# Patient Record
Sex: MALE | Race: WHITE | Employment: FULL TIME | URBAN - METROPOLITAN AREA
[De-identification: names, ages, dates, MRNs, and addresses within clinical notes are randomized per-mention and may not be internally consistent; named-entity substitution may affect disease eponyms.]

---

## 2021-03-27 ENCOUNTER — APPOINTMENT (OUTPATIENT)
Dept: CT IMAGING | Age: 26
End: 2021-03-27
Attending: EMERGENCY MEDICINE
Payer: COMMERCIAL

## 2021-03-27 ENCOUNTER — HOSPITAL ENCOUNTER (EMERGENCY)
Age: 26
Discharge: LEFT BEFORE DISCHARGE | End: 2021-03-27
Attending: EMERGENCY MEDICINE | Admitting: EMERGENCY MEDICINE
Payer: COMMERCIAL

## 2021-03-27 VITALS
TEMPERATURE: 97.7 F | RESPIRATION RATE: 18 BRPM | DIASTOLIC BLOOD PRESSURE: 98 MMHG | HEART RATE: 103 BPM | SYSTOLIC BLOOD PRESSURE: 115 MMHG | BODY MASS INDEX: 35.22 KG/M2 | OXYGEN SATURATION: 99 % | HEIGHT: 70 IN | WEIGHT: 246.03 LBS

## 2021-03-27 DIAGNOSIS — F10.20 ALCOHOL USE DISORDER, MODERATE, DEPENDENCE (HCC): ICD-10-CM

## 2021-03-27 DIAGNOSIS — K76.82 HEPATIC ENCEPHALOPATHY: Primary | ICD-10-CM

## 2021-03-27 DIAGNOSIS — R42 DIZZINESS: ICD-10-CM

## 2021-03-27 DIAGNOSIS — R79.89 ELEVATED LFTS: ICD-10-CM

## 2021-03-27 LAB
ALBUMIN SERPL-MCNC: 3.7 G/DL (ref 3.5–5)
ALBUMIN/GLOB SERPL: 1 {RATIO} (ref 1.1–2.2)
ALP SERPL-CCNC: 77 U/L (ref 45–117)
ALT SERPL-CCNC: 130 U/L (ref 12–78)
AMMONIA PLAS-SCNC: 104 UMOL/L
ANION GAP SERPL CALC-SCNC: 5 MMOL/L (ref 5–15)
AST SERPL-CCNC: 81 U/L (ref 15–37)
BASOPHILS # BLD: 0.1 K/UL (ref 0–0.1)
BASOPHILS NFR BLD: 1 % (ref 0–1)
BILIRUB SERPL-MCNC: 0.5 MG/DL (ref 0.2–1)
BUN SERPL-MCNC: 12 MG/DL (ref 6–20)
BUN/CREAT SERPL: 11 (ref 12–20)
CALCIUM SERPL-MCNC: 8.5 MG/DL (ref 8.5–10.1)
CHLORIDE SERPL-SCNC: 110 MMOL/L (ref 97–108)
CO2 SERPL-SCNC: 26 MMOL/L (ref 21–32)
CREAT SERPL-MCNC: 1.05 MG/DL (ref 0.7–1.3)
DIFFERENTIAL METHOD BLD: ABNORMAL
EOSINOPHIL # BLD: 0.3 K/UL (ref 0–0.4)
EOSINOPHIL NFR BLD: 4 % (ref 0–7)
ERYTHROCYTE [DISTWIDTH] IN BLOOD BY AUTOMATED COUNT: 13.8 % (ref 11.5–14.5)
ETHANOL SERPL-MCNC: 297 MG/DL
GLOBULIN SER CALC-MCNC: 3.7 G/DL (ref 2–4)
GLUCOSE BLD STRIP.AUTO-MCNC: 109 MG/DL (ref 65–100)
GLUCOSE SERPL-MCNC: 100 MG/DL (ref 65–100)
HCT VFR BLD AUTO: 45 % (ref 36.6–50.3)
HGB BLD-MCNC: 16.1 G/DL (ref 12.1–17)
IMM GRANULOCYTES # BLD AUTO: 0 K/UL (ref 0–0.04)
IMM GRANULOCYTES NFR BLD AUTO: 0 % (ref 0–0.5)
LYMPHOCYTES # BLD: 4 K/UL (ref 0.8–3.5)
LYMPHOCYTES NFR BLD: 48 % (ref 12–49)
MAGNESIUM SERPL-MCNC: 2.2 MG/DL (ref 1.6–2.4)
MCH RBC QN AUTO: 33.1 PG (ref 26–34)
MCHC RBC AUTO-ENTMCNC: 35.8 G/DL (ref 30–36.5)
MCV RBC AUTO: 92.6 FL (ref 80–99)
MONOCYTES # BLD: 0.8 K/UL (ref 0–1)
MONOCYTES NFR BLD: 9 % (ref 5–13)
NEUTS SEG # BLD: 3.2 K/UL (ref 1.8–8)
NEUTS SEG NFR BLD: 38 % (ref 32–75)
NRBC # BLD: 0 K/UL (ref 0–0.01)
NRBC BLD-RTO: 0 PER 100 WBC
PHOSPHATE SERPL-MCNC: 3 MG/DL (ref 2.6–4.7)
PLATELET # BLD AUTO: 219 K/UL (ref 150–400)
PMV BLD AUTO: 8.9 FL (ref 8.9–12.9)
POTASSIUM SERPL-SCNC: 3.9 MMOL/L (ref 3.5–5.1)
PROT SERPL-MCNC: 7.4 G/DL (ref 6.4–8.2)
RBC # BLD AUTO: 4.86 M/UL (ref 4.1–5.7)
RBC MORPH BLD: ABNORMAL
SERVICE CMNT-IMP: ABNORMAL
SODIUM SERPL-SCNC: 141 MMOL/L (ref 136–145)
WBC # BLD AUTO: 8.4 K/UL (ref 4.1–11.1)

## 2021-03-27 PROCEDURE — 74011250637 HC RX REV CODE- 250/637: Performed by: EMERGENCY MEDICINE

## 2021-03-27 PROCEDURE — 80053 COMPREHEN METABOLIC PANEL: CPT

## 2021-03-27 PROCEDURE — 82962 GLUCOSE BLOOD TEST: CPT

## 2021-03-27 PROCEDURE — 82140 ASSAY OF AMMONIA: CPT

## 2021-03-27 PROCEDURE — 83735 ASSAY OF MAGNESIUM: CPT

## 2021-03-27 PROCEDURE — 99283 EMERGENCY DEPT VISIT LOW MDM: CPT

## 2021-03-27 PROCEDURE — 82077 ASSAY SPEC XCP UR&BREATH IA: CPT

## 2021-03-27 PROCEDURE — 70450 CT HEAD/BRAIN W/O DYE: CPT

## 2021-03-27 PROCEDURE — 84100 ASSAY OF PHOSPHORUS: CPT

## 2021-03-27 PROCEDURE — 36415 COLL VENOUS BLD VENIPUNCTURE: CPT

## 2021-03-27 PROCEDURE — 85025 COMPLETE CBC W/AUTO DIFF WBC: CPT

## 2021-03-27 RX ADMIN — LACTULOSE 30 ML: 20 SOLUTION ORAL at 22:24

## 2021-03-28 NOTE — ED PROVIDER NOTES
EMERGENCY DEPARTMENT HISTORY AND PHYSICAL EXAM      Date: 3/27/2021  Patient Name: Sami Fernandez    History of Presenting Illness     Chief Complaint   Patient presents with    Altered mental status     Patient states that he has been having trouble collecting his thoughts since yesterday. His father is with him stating that he has been very emotional and beligerant at times. He also states that he has not been making sense while trying to talk to him,. Patient is diabetic and does not regularly check his sugar. He states he knows if his sugar is high or low by the way he feels. He also has drank alcohol today but \"only a little bit\" . His blood glucose in triage is 109. Patient denies any SI or HI .  Headache       History Provided By: Patient    HPI: Sami Fernandez, 30 y.o. male  With past medical history of alcohol use daily, diabetes, and daily marijuana use presenting today with symptoms of confusion and difficulty thinking. The patient says that this started yesterday. He notes that he has not had symptoms like this in the past.  The patient denies any fevers. He notes that he does have vomiting in the morning sometimes when he feels an acid sensation in his stomach. He notes that he smokes marijuana and typically smokes blunts and bowls. He does not use concentrated THC oil or dab wax. No other drug use. He does note that he drinks alcohol daily and typically will drink a couple of beers and often x 2-3 shots during the week and then more on the weekends. He denies any prior history of liver disease. No weakness, numbness, or tingling. His dad and him are concerned about \"personality changes \"where he feels more agitated because he cannot gather his thoughts well. There are no other complaints, changes, or physical findings at this time. PCP: None    No current facility-administered medications on file prior to encounter. No current outpatient medications on file prior to encounter. Past History     Past Medical History:  No past medical history on file. Alcohol use disorder    Past Surgical History:  No past surgical history on file. Family History:  No family history on file. Social History:  Social History     Tobacco Use    Smoking status: Not on file   Substance Use Topics    Alcohol use: Not on file    Drug use: Not on file   Alcohol: 1-2 beers and 2-3 shots per day  Drug use: Marijuana Daily      Allergies:  No Known Allergies      Review of Systems   Constitutional: No  fever  Skin: No  rash  HEENT: No  nasal congestion  Resp: No cough  CV: No chest pain  GI: No vomiting  : No dysuria  MSK: No joint pain  Neuro: No numbness  Psych: No anxiety      Physical Exam     Patient Vitals for the past 12 hrs:   Temp Pulse Resp BP SpO2   03/27/21 1947 97.7 °F (36.5 °C) (!) 103 18 (!) 146/99 99 %     General: alert, No acute distress  Eyes: EOMI, normal conjunctiva  ENT: moist mucous membranes. Neck: Active, full ROM of neck. Skin: No rashes. no jaundice              Lungs: Equal chest expansion. no respiratory distress. Heart: tachycardic with a  regular rhythm     no peripheral edema     Abd:  non distended soft   Back: Full ROM  MSK: Full, active ROM in all 4 extremities.    Neuro:   II: vision grossly intact  III, IV, VI: Extraocular motion intact, pupils reactive to light  V: facial sensation intact  VII: face symmetric with normal eye closure and smile  VIII: hearing intact to finger rub bilaterally  IX, X: palate elevates symmetrically, phonation normal  XII: tongue midline with normal movements  Motor: normal bulk, tone, and strength bilaterally, no pronator drift  Sensory: normal sensation to light touch in bilateral upper and lower extremities  Coordination: intact to rapid alternating movements  Psych: Cooperative with exam; Appropriate mood and affect                 Diagnostic Study Results     Labs -     Recent Results (from the past 12 hour(s))   GLUCOSE, POC Collection Time: 03/27/21  7:50 PM   Result Value Ref Range    Glucose (POC) 109 (H) 65 - 100 mg/dL    Performed by Ulisses Meeks RN    CBC WITH AUTOMATED DIFF    Collection Time: 03/27/21  8:18 PM   Result Value Ref Range    WBC 8.4 4.1 - 11.1 K/uL    RBC 4.86 4.10 - 5.70 M/uL    HGB 16.1 12.1 - 17.0 g/dL    HCT 45.0 36.6 - 50.3 %    MCV 92.6 80.0 - 99.0 FL    MCH 33.1 26.0 - 34.0 PG    MCHC 35.8 30.0 - 36.5 g/dL    RDW 13.8 11.5 - 14.5 %    PLATELET 317 474 - 459 K/uL    MPV 8.9 8.9 - 12.9 FL    NRBC 0.0 0  WBC    ABSOLUTE NRBC 0.00 0.00 - 0.01 K/uL    NEUTROPHILS 38 32 - 75 %    LYMPHOCYTES 48 12 - 49 %    MONOCYTES 9 5 - 13 %    EOSINOPHILS 4 0 - 7 %    BASOPHILS 1 0 - 1 %    IMMATURE GRANULOCYTES 0 0.0 - 0.5 %    ABS. NEUTROPHILS 3.2 1.8 - 8.0 K/UL    ABS. LYMPHOCYTES 4.0 (H) 0.8 - 3.5 K/UL    ABS. MONOCYTES 0.8 0.0 - 1.0 K/UL    ABS. EOSINOPHILS 0.3 0.0 - 0.4 K/UL    ABS. BASOPHILS 0.1 0.0 - 0.1 K/UL    ABS. IMM. GRANS. 0.0 0.00 - 0.04 K/UL    DF MANUAL      RBC COMMENTS NORMOCYTIC, NORMOCHROMIC     METABOLIC PANEL, COMPREHENSIVE    Collection Time: 03/27/21  8:18 PM   Result Value Ref Range    Sodium 141 136 - 145 mmol/L    Potassium 3.9 3.5 - 5.1 mmol/L    Chloride 110 (H) 97 - 108 mmol/L    CO2 26 21 - 32 mmol/L    Anion gap 5 5 - 15 mmol/L    Glucose 100 65 - 100 mg/dL    BUN 12 6 - 20 MG/DL    Creatinine 1.05 0.70 - 1.30 MG/DL    BUN/Creatinine ratio 11 (L) 12 - 20      GFR est AA >60 >60 ml/min/1.73m2    GFR est non-AA >60 >60 ml/min/1.73m2    Calcium 8.5 8.5 - 10.1 MG/DL    Bilirubin, total 0.5 0.2 - 1.0 MG/DL    ALT (SGPT) 130 (H) 12 - 78 U/L    AST (SGOT) 81 (H) 15 - 37 U/L    Alk.  phosphatase 77 45 - 117 U/L    Protein, total 7.4 6.4 - 8.2 g/dL    Albumin 3.7 3.5 - 5.0 g/dL    Globulin 3.7 2.0 - 4.0 g/dL    A-G Ratio 1.0 (L) 1.1 - 2.2     ETHYL ALCOHOL    Collection Time: 03/27/21  8:18 PM   Result Value Ref Range    ALCOHOL(ETHYL),SERUM 297 (H) <10 MG/DL   MAGNESIUM    Collection Time: 03/27/21  8:18 PM   Result Value Ref Range    Magnesium 2.2 1.6 - 2.4 mg/dL   PHOSPHORUS    Collection Time: 03/27/21  8:18 PM   Result Value Ref Range    Phosphorus 3.0 2.6 - 4.7 MG/DL   AMMONIA    Collection Time: 03/27/21  8:51 PM   Result Value Ref Range    Ammonia 104 (H) <32 UMOL/L       Radiologic Studies -   CT HEAD WO CONT   Final Result      No acute intracranial abnormality on this noncontrast head CT. CT Results  (Last 48 hours)               03/27/21 2116  CT HEAD WO CONT Final result    Impression:      No acute intracranial abnormality on this noncontrast head CT. Narrative:  EXAM: CT HEAD WO CONT       INDICATION: Confusion for one day. EtOH and marijuana use. COMPARISON: None       TECHNIQUE: Noncontrast head CT. Coronal and sagittal reformats. CT dose   reduction was achieved through the use of a standardized protocol tailored for   this examination and automatic exposure control for dose modulation. FINDINGS: The ventricles and sulci are age-appropriate without hydrocephalus. There is no mass effect or midline shift. There is no intracranial hemorrhage or   extra-axial fluid collection. There is no abnormal area of decreased density to   suggest infarct. The calvarium is intact. The visualized paranasal sinuses and mastoid air cells   are clear. CXR Results  (Last 48 hours)    None          Medical Decision Making   I am the first provider for this patient. I reviewed the vital signs, available nursing notes, past medical history, past surgical history, family history and social history. Vital Signs-Reviewed the patient's vital signs.   Patient Vitals for the past 12 hrs:   Temp Pulse Resp BP SpO2   03/27/21 1947 97.7 °F (36.5 °C) (!) 103 18 (!) 146/99 99 %       Pulse Oximetry Analysis - 99% on room air  -  Interpretation: Normal    Cardiac Monitor:   Rate: 103 bpm  Rhythm: Sinus Tachycardia      Provider Notes (Medical Decision Grisel g):     Differential Diagnosis: Alcohol use disorder, marijuana use disorder, electrolyte arrangement, Wernickes encephalopathy, stroke    Initial Plan: Will obtain basic laboratory studies, CT the head, alcohol level and UDS and reassess. The patient has overall vague symptoms, likely related to his alcohol use disorder and marijuana use disorder. ED Course:   Initial assessment performed. The patients presenting problems have been discussed, and they are in agreement with the care plan formulated and outlined with them. I have encouraged them to ask questions as they arise throughout their visit. ED Course as of Mar 27 2234   Sat Mar 27, 2021   2228 10:29 PM  \"I informed the pt that he needed further observation and further workup for adequate evaluation for his Hepatic encephalopathy  and that by refusing the above, he is at risk for further deterioration, coma, and worsening liver function  He is awake, alert, and he understands his condition and the risks involved in leaving. The patient has received no pain medications. He is clinically aware of his surroundings and able to ask appropriate questions, the patients relative and the nurse present confirms he is not clinically intoxicated and able to make medical decisions. He verbalized knowing the risks and understood it was recommended that he stay and could also return at any time. The patient's relative was present for the discussion and also verbalized that they understood both diagnosis, risks and could return at any time. They were both provided with warnings regarding worsening of his condition and were provided instructions to follow up with PCP tomorrow or return to the Emergency Room as soon as possible. This discussion was witnessed by nurse Ran. Anika Best MD            [NW]   1837 On my interpretation of the patient's laboratory studies ammonia is elevated, LFTs are elevated as well, alcohol level 297.     [NW]   3818 On my interpretation of the patient CT had no evidence of acute abnormality. [NW]   2295 Patient presenting today with vague symptoms including confusion, dizziness, agitation. Found to have an elevated ammonia level. The patient is a chronic alcohol user and uses alcohol daily with withdrawal symptoms. He and his dad and myself had a long conversation and I discussed with him the dangers of continued alcohol usage. I recommended admission to the hospital for the hepatic encephalopathy and initiation of lactulose therapy. The patient does not wish to be admitted to the hospital.  Although he has had alcohol today, he is clinically sober, awake, answering all questions, and competent to make his own medical decisions. He elected to not be admitted to the hospital.  I discussed with him that I will send him home with lactulose therapy and refer him to primary care provider as well as GI for further evaluation. We discussed his alcohol use could cause worsening of his liver function and possibly hepatic failure. The patient and his dad understand the risks and he ultimately elects to leave the emergency department [address]    [NW]      ED Course User Index  [NW] Clare Ogden MD       I, Tra Coelho MD, am the attending of record for this patient encounter. Dispo: AMA       PLAN:  Current Discharge Medication List      START taking these medications    Details   lactulose (CEPHULAC) 20 gram packet Take 1 Packet by mouth two (2) times a day for 30 days. Qty: 60 Packet, Refills: 0           2. Follow-up Information     Follow up With Specialties Details Why Vincent Martinez MD Gastroenterology   [address]  [address] [facility]  [phone]          3. Return to ED if worse       Diagnosis     Clinical Impression:   1. Hepatic encephalopathy ([address]    2. Dizziness    3.  Alcohol use disorder, moderate, dependence (HCC)    4. Elevated LFTs Attestations:    Bakari Valdivia MD    Please note that this dictation was completed with Ik[employer], the computer voice recognition software. Quite often unanticipated grammatical, syntax, homophones, and other interpretive errors are inadvertently transcribed by the computer software. Please disregard these errors. Please excuse any errors that have escaped final proofreading. Thank you.

## 2021-03-28 NOTE — DISCHARGE INSTRUCTIONS
Substance Abuse and Addiction Resources    [facility]  [phone]  Closed meeting- family members that have been affected bysomeone alcohol abuse  Open meeting everyone can attend. Alcoholic's Anonymous [phone]  Non professional (alcoholics in recovery helping others)  [employer] (talk about sobriety, have desire)  No charge    [address] Venia [place] is the Treatment Consultant in the [facility]  Free one-on-one phone consultation and insurance verification  12 step based meetings and philosophy  Family programs and sessions    [facility] [phone]  Free individual assessment  Intensive outpatient outpatient program  Ambulatory withdrawal management/detoxification  Insurance required    [facility] [phone] ext [phone] or [phone] for patients with no insurance, Medicaid, Medicare  Sliding fee scale available for self pay  Patients go Monday-Friday from 8-4:30 to central intake for a shelter and then to [facility] for services  Offers a variety of services I.e. Laundry, shower, eye clinic, medical clinic, dental, substance abuse services, case management, etc.    [facility] [phone]  Make appointment with counselor  $23 fee for initial hour intake    [facility] [phone]  Offers Detox and Inpatient Treatment for men only. Social detox  Is a homeless shelter with longterm 12 step program. Can choose just access to the hshelter component  Must be able to walk <1miles one way to attend classes, be highly motivated and willing to complete all 12 steps. 8 months- 1 year is the typical length of stay if accepted    [facility] [phone]  For residents of [facility]  They offer outpatient treatment and can make referrals for inpatient careBased on income. Will [employer]. Accept Medicaid.   They have a walk in clinic that patients can go to be screened for services. Two on the [facility] and [place] side [facility]:   [place] [id number], [facility] (near [facility]). Walk in hours: Mon or Wed       12:30pm - Tammie Jason Lorena Bynum. Walk in hours: Tuesday 12:30pm 3pm Wed       8:30am- 11am    [employer] [phone]  $3500 entry fee  12 step meeting plan  No sex offenders accepted. Must get a job within 30 days after admission  After the 12 week, additional $125/week to stay    Narcotic Anonymous [phone], [phone]  Will refer to [facility] into Triage (takes 10-15 minutes)  Proof of [facility] residence with Picture ID  Medicaid and Self Pay. NO Private insurance    [place] [phone]  Referrals come from organizations like [facility] [employer] [facility], [employer], [employer] [facility]. Must be self pay. No insurance allowed. No patients on prescribed narcotics. No sex offenders. Need all medical mental health information and medication list sent prior to admit. [facility] [phone] ext [address] between 21-65  Need social security card and picture ID  Must pass breath test and 10 panel Urine Drug Screen  No Sex Offenders or Psychiatric patients  Must not have been a 3x repeat at this facility  6 month in house- has to participate in work therapy 40 hours/week  Participates in spiritual classes, bible study and Zoroastrianism    Malay Alcoholics Anonymous [address], sent by [employer]  No Sex 1140 [facility] (by [facility] [address])    [address] [place] [address]  [phone]  Monday through Friday 8:30am to 5:00pm  Brief Telephone Interview. Then will be scheduled for next substance abuse services orientation group and then scheduled for intake interview.     [facility]  [phone]  Walk in Monday through Friday 9:00AM to 3:00PM  Bring Picture ID, Proof of residence (piece of mail), Proof of Insurance (Medicaid/sliding scale), Proof of income (any)    [facility] [phone]  Walk in then Assessment by licensed professional   [place] office ([address] [facility]) Monday, Tuesday, Thursday  9AM to 789 [facility] office ([address] [place], [address]) [address]  [phone]  Walk In Monday to Friday starting at [address] ID, Proof of residence (piece of mail), Proof of insurance (Medicaid/sliding scale)  At 9AM is the Substance Abuse Services Orientation Group and then scheduled for Intake Evaluation.    Local Primary Care Physicians  [facility] [phone]  Randeen Moritz, MD Selwyn Boron, MD Lydia Estrada MD [facility] [phone]  Courtney Connor, P  Bonny Taylor, MD Stefan Gomez MD [address] [address] [phone]  Rafael Freitas, MD Montse Donald MD [address] [facility] [phone]  MD Dakota Cueva MD Marigene Shape, MD Zachery Gambles, MD   [facility] [phone]  EUGENIA ZARAGOZA, MD Khurram Garrett, NP 1001 [facility] [phone]  [facility], MD Chau Bernard, MD Shadi Sánchez, MD Andrea Chatterjee, MD Chris Wharton, MD Beryle Severance, MD   [facility]  Sylvia Jones MD [facility] [phone]  MD Chen Barksdale, NP  Lashell Morales, MD Mariaelena Gooden, MD Severiano Gear, MD Salud Pastrana, MD Sheldon Stevens MD   [address]80 [facility] [phone]  Robbi Godfrey, MD Marly Britt, FNP  Neha Randle, NP  Kaitlyn Hull, MD Ashby Severance, MD Shantel Mix, MD EL[facility]IN [facility] [phone]  MD Ulysses Patel MD Mardene Standard, MD Emeline Passy, MD Ottis Mylar, MD   [address] [phone]  MD Jude Osuna MD [facility] [phone]  Brandon Hawthorne, MD Wilber Worthington, MD Chandra Elliott MD   1903 [facility] [phone]  Olimpia Guajardo, MD Andrea Arzola, MD Eryn Lizama MD Estel Skipper, MD Olla Meter, NP  Valerie Verdin MD [address]  66   [phone]  MD Allyson Duncan, MD Rudi Byrd MD   2106 [facility] [phone]  MD Renae Gutierrez, FNP  Gretchen Potts, PAWaldoC  Gretchen Potts, FNP  Josh Broussard, PAWaldoC  MD Juan Hernandez, NP   Magaly Nance DO             Miscellaneous:  Alfredo Day MD [facility] Departments     For adult and child immunizations, family planning, TB screening, STD testing and women's health services. [facility]: [place] [phone]      [facility]   657 [facility]   [address]   170 [facility] 200 [facility] [phone]      [address] [place] [address]          [address] [place] [address]     For primary care services, woman and child wellness, and some clinics providing specialty care. [facility] -- [address] [phone]   [address] [place] [address] 200 [facility] [address] [place] [address] [phone]   [address] [place] [address] [address] [place] [address] [phone] [address]  [address]4 [facility] [address] [place] [address] Dr [phone]   [address] [place] [address] Road  [address] [address] [place] [phone]   [facility] 81 [facility] [phone]   [facility] 1051 [facility] [phone]   [facility]: [facility] [address] [facility] [address] [phone], ext [address] 79 [facility] [address] [phone]     [address] [phone]   [facility] 5850 [facility]  [phone]   [facility]  [address] ([email or web address] ([employer]). [employer]/about/mission. asp) [phone]         Sexual Health/Woman Wellness Clinics    For STD/HIV testing and treatment, pregnancy testing and services, men's health, birth control services, LGBT services, and hepatitis/HPV vaccine services. [employer] for [place] [employer] [address]. [facility] 75 [facility] 157[facility], [facility] [address] [facility] [phone]   [facility] [address] 5th floor [phone]   Pregnancy 3928 [facility] [address] for Women [address]  [facility] [phone]         [place] [facility] [address] [place] [address]   [phone]   [place]   [phone]   Women, Infant and Children's Services: [address] [phone]       [address] [place] [address]   [phone]   [address]   [facility]     [phone]   [address] Crisis   1212 [facility] [phone]

## 2021-03-28 NOTE — ED NOTES
Patient given printed discharge instructions reviewed by the MD. Patient understands instructions/follow up recommendations.  Patient ambulated out of ED on own with

## 2021-03-28 NOTE — ED NOTES
Pt comes to ED intoxicated and c/o fogginess and disorganized thinking. Parent at the bedside states pt has been drinking more than normal and has been crying and more erratic with his behavior.